# Patient Record
Sex: FEMALE | Race: WHITE | HISPANIC OR LATINO | Employment: FULL TIME | URBAN - METROPOLITAN AREA
[De-identification: names, ages, dates, MRNs, and addresses within clinical notes are randomized per-mention and may not be internally consistent; named-entity substitution may affect disease eponyms.]

---

## 2022-01-28 ENCOUNTER — OFFICE VISIT (OUTPATIENT)
Dept: OPHTHALMOLOGY | Facility: CLINIC | Age: 27
End: 2022-01-28

## 2022-01-28 DIAGNOSIS — H40.52X3 SEVERE STAGE SECONDARY GLAUCOMA OF LEFT EYE DUE TO COMBINATION MECHANISMS: Primary | ICD-10-CM

## 2022-01-28 DIAGNOSIS — H50.112 EXOTROPIA OF LEFT EYE: ICD-10-CM

## 2022-01-28 DIAGNOSIS — H26.9 CORTICAL CATARACT OF LEFT EYE: ICD-10-CM

## 2022-01-28 PROCEDURE — 99202 OFFICE O/P NEW SF 15 MIN: CPT | Mod: PBBFAC | Performed by: OPTOMETRIST

## 2022-01-28 PROCEDURE — 99999 PR PBB SHADOW E&M-NEW PATIENT-LVL II: ICD-10-PCS | Mod: PBBFAC,,, | Performed by: OPTOMETRIST

## 2022-01-28 PROCEDURE — 92004 COMPRE OPH EXAM NEW PT 1/>: CPT | Mod: S$PBB,,, | Performed by: OPTOMETRIST

## 2022-01-28 PROCEDURE — 92133 CPTRZD OPH DX IMG PST SGM ON: CPT | Mod: PBBFAC | Performed by: OPTOMETRIST

## 2022-01-28 PROCEDURE — 92133 POSTERIOR SEGMENT OCT OPTIC NERVE(OCULAR COHERENCE TOMOGRAPHY) - OU - BOTH EYES: ICD-10-PCS | Mod: 26,S$PBB,, | Performed by: OPTOMETRIST

## 2022-01-28 PROCEDURE — 99999 PR PBB SHADOW E&M-NEW PATIENT-LVL II: CPT | Mod: PBBFAC,,, | Performed by: OPTOMETRIST

## 2022-01-28 PROCEDURE — 92004 PR EYE EXAM, NEW PATIENT,COMPREHESV: ICD-10-PCS | Mod: S$PBB,,, | Performed by: OPTOMETRIST

## 2022-01-28 RX ORDER — DORZOLAMIDE HYDROCHLORIDE AND TIMOLOL MALEATE 20; 5 MG/ML; MG/ML
1 SOLUTION/ DROPS OPHTHALMIC 2 TIMES DAILY
Qty: 10 ML | Refills: 6 | Status: SHIPPED | OUTPATIENT
Start: 2022-01-28 | End: 2023-10-10 | Stop reason: SDUPTHER

## 2022-01-28 RX ORDER — BRIMONIDINE TARTRATE 1.5 MG/ML
1 SOLUTION/ DROPS OPHTHALMIC 3 TIMES DAILY
Qty: 15 ML | Refills: 4 | Status: SHIPPED | OUTPATIENT
Start: 2022-01-28 | End: 2022-02-02

## 2022-01-28 RX ORDER — PREDNISOLONE ACETATE 10 MG/ML
1 SUSPENSION/ DROPS OPHTHALMIC 4 TIMES DAILY
Qty: 5 ML | Refills: 0 | Status: SHIPPED | OUTPATIENT
Start: 2022-01-28 | End: 2022-03-02 | Stop reason: ALTCHOICE

## 2022-02-02 ENCOUNTER — OFFICE VISIT (OUTPATIENT)
Dept: OPHTHALMOLOGY | Facility: CLINIC | Age: 27
End: 2022-02-02

## 2022-02-02 DIAGNOSIS — H26.9 CORTICAL CATARACT OF LEFT EYE: ICD-10-CM

## 2022-02-02 DIAGNOSIS — H50.112 EXOTROPIA OF LEFT EYE: ICD-10-CM

## 2022-02-02 DIAGNOSIS — H20.9 IRITIS OF LEFT EYE: ICD-10-CM

## 2022-02-02 DIAGNOSIS — H40.52X3 SEVERE STAGE SECONDARY GLAUCOMA OF LEFT EYE DUE TO COMBINATION MECHANISMS: Primary | ICD-10-CM

## 2022-02-02 PROCEDURE — 99212 OFFICE O/P EST SF 10 MIN: CPT | Mod: PBBFAC | Performed by: OPHTHALMOLOGY

## 2022-02-02 PROCEDURE — 99202 PR OFFICE/OUTPT VISIT, NEW, LEVL II, 15-29 MIN: ICD-10-PCS | Mod: S$PBB,,, | Performed by: OPHTHALMOLOGY

## 2022-02-02 PROCEDURE — 99202 OFFICE O/P NEW SF 15 MIN: CPT | Mod: S$PBB,,, | Performed by: OPHTHALMOLOGY

## 2022-02-02 PROCEDURE — 99999 PR PBB SHADOW E&M-EST. PATIENT-LVL II: CPT | Mod: PBBFAC,,, | Performed by: OPHTHALMOLOGY

## 2022-02-02 PROCEDURE — 99999 PR PBB SHADOW E&M-EST. PATIENT-LVL II: ICD-10-PCS | Mod: PBBFAC,,, | Performed by: OPHTHALMOLOGY

## 2022-02-02 RX ORDER — BRIMONIDINE TARTRATE 2 MG/ML
1 SOLUTION/ DROPS OPHTHALMIC 3 TIMES DAILY
Qty: 5 ML | Refills: 6 | Status: SHIPPED | OUTPATIENT
Start: 2022-02-02 | End: 2023-10-10 | Stop reason: SDUPTHER

## 2022-02-02 NOTE — PROGRESS NOTES
HPI     Pt in today for a follow-up with from [facility]. Pt states her vision has been   stable since her last visit. Pt denies any ocular pain or discomfort. Pt   states she tried to get all the drops from the pharmacy, but she couldn't   Brimonidine.      [facility] referral      1. Uveitic COAG OS  2. Cortical Cataract OS  3. Exotropia OS      Pred QID OS  Dorz/Donny BID OS  Brimonidine BID OS      Last edited by Deisi Strauss on 2/2/2022  4:03 PM. (History)            Assessment /Plan     For exam results, see Encounter Report.      ICD-10-CM ICD-9-CM    1. 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    Discussed possibly performing SLT OS to lower IOP, however, patient reports with no pain despite elevated pressure. Will likely proceed without further treatment.     Recommend patient continue with current drop regimine, will monitor IOP at next visit.    2. Cortical cataract of left eye  H26.9 366.8 Removal not likely to improve vision, will monitor at this time.    3. Exotropia of left eye  H50.10 378.10 Monitor at this time       Return to clinic 1 month         Pred QID OS  Dorz/Donny BID OS  Brimonidine BID OS

## 2022-02-02 NOTE — PROGRESS NOTES
HPI     Pt in today for a follow-up with from [facility]. Pt states her vision has been   stable since her last visit. Pt denies any ocular pain or discomfort. Pt   states she tried to get all the drops from the pharmacy, but she couldn't   Brimonidine.      [facility] referral      1. Uveitic COAG OS  2. Cortical Cataract OS  3. Exotropia OS      Pred QID OS  Dorz/Donny BID OS  Brimonidine BID OS      Last edited by Deisi Strauss on 2/2/2022  4:03 PM. (History)            Assessment /Plan     For exam results, see Encounter Report.      ICD-10-CM ICD-9-CM    1. 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    Discussed performing SLT OS to lower IOP, however, patient reports with no pain despite elevated pressure. Will likely proceed without further treatment.     Recommend patient continue with current drop regimine, will monitor IOP at next visit.    2. Cortical cataract of left eye  H26.9 366.8 Removal not likely to improve vision, will monitor at this time.    3. Exotropia of left eye  H50.10 378.10 Monitor    4. Iritis of left eye  H20.9 364.3 Slight worsening on exam today, will continue Pred QID OS. Could consider d/c medication at next visit.       Return to clinic 1 month for IOP check        Pred QID OS  Dorz/Donny BID OS  Brimonidine BID OS

## 2022-02-02 NOTE — Clinical Note
Bryan,  I saw Miss Hay today, unfortunately her IOP remains elevated in the left eye. I discussed with her possibly performing an SLT, but due to limited visual potential we may proceed without additional treatment.   She had trouble picking up the Brimonidine due to cost-- it looks like the 1.5% was called in by mistake, so we are going to try the 2.0%.   Thank you! Dev

## 2022-03-02 ENCOUNTER — OFFICE VISIT (OUTPATIENT)
Dept: OPHTHALMOLOGY | Facility: CLINIC | Age: 27
End: 2022-03-02

## 2022-03-02 DIAGNOSIS — H26.9 CORTICAL CATARACT OF LEFT EYE: ICD-10-CM

## 2022-03-02 DIAGNOSIS — H20.9 IRITIS OF LEFT EYE: ICD-10-CM

## 2022-03-02 DIAGNOSIS — H40.52X3 SEVERE STAGE SECONDARY GLAUCOMA OF LEFT EYE DUE TO COMBINATION MECHANISMS: Primary | ICD-10-CM

## 2022-03-02 DIAGNOSIS — H50.112 EXOTROPIA OF LEFT EYE: ICD-10-CM

## 2022-03-02 PROCEDURE — 99212 PR OFFICE/OUTPT VISIT, EST, LEVL II, 10-19 MIN: ICD-10-PCS | Mod: S$PBB,,, | Performed by: OPHTHALMOLOGY

## 2022-03-02 PROCEDURE — 99999 PR PBB SHADOW E&M-EST. PATIENT-LVL II: ICD-10-PCS | Mod: PBBFAC,,, | Performed by: OPHTHALMOLOGY

## 2022-03-02 PROCEDURE — 99212 OFFICE O/P EST SF 10 MIN: CPT | Mod: PBBFAC | Performed by: OPHTHALMOLOGY

## 2022-03-02 PROCEDURE — 99212 OFFICE O/P EST SF 10 MIN: CPT | Mod: S$PBB,,, | Performed by: OPHTHALMOLOGY

## 2022-03-02 PROCEDURE — 99999 PR PBB SHADOW E&M-EST. PATIENT-LVL II: CPT | Mod: PBBFAC,,, | Performed by: OPHTHALMOLOGY

## 2022-03-02 NOTE — PROGRESS NOTES
HPI     Glaucoma     Comments: 1 month for IOP check              Comments     Patient states no visual complaints but this past Monday pain with the   left eye only. Pain Scale 5    [facility] referral      1. Uveitic COAG OS  2. Cortical Cataract OS  3. Exotropia OS      Pred QID OS  Dorz/Donny BID OS  Brimonidine BID OS          Last edited by Stephany Burgess on 3/2/2022  4:06 PM. (History)            Assessment /Plan     For exam results, see Encounter Report.      ICD-10-CM ICD-9-CM    1. 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 Iritis of left eye  H20.9 364.3 improved today yet increased IOP still and pt is NLP   Pupil is fixed and dilated    3. Cortical cataract of left eye  H26.9 366.8 Follow    4. Exotropia of left eye  H50.10 378.10 Follow      Stop Pred today unless sxs increase then she can increase     Dorz/Donny BID OS  Brimonidine BID OS     RETURN TO CLINIC 6 month IOP

## 2022-11-14 NOTE — PROGRESS NOTES
HPI     Annual Exam     Comments: New Patient               Comments     Last Eye Exam 2021  Patient states decrease with overall vision.  No ocular pain/discomfort.                Last edited by Stephany Burgess on 1/28/2022  3:40 PM. (History)        No vision OS x 1 year per pt  No history of trauma OS    Assessment /Plan     For exam results, see Encounter Report.    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  Dispense: 5 mL; Refill: 0  -     dorzolamide-timolol 2-0.5% (COSOPT) 22.3-6.8 mg/mL ophthalmic solution; Place 1 drop into the left eye 2 (two) times daily.  Dispense: 10 mL; Refill: 6  -     brimonidine 0.15 % OPTH DROP (ALPHAGAN) 0.15 % ophthalmic solution; Place 1 drop into the left eye 3 (three) times daily.  Dispense: 15 mL; Refill: 4      Uveitic glaucoma OS  No pain  Discussed monocular precautions with patient  Start gtts as directed  Refer to Dr Baptiste for eval/consult, next available                      - dvt ppx, requires full dose lovenox

## 2023-10-10 ENCOUNTER — OFFICE VISIT (OUTPATIENT)
Dept: OPHTHALMOLOGY | Facility: CLINIC | Age: 28
End: 2023-10-10

## 2023-10-10 DIAGNOSIS — H50.112 EXOTROPIA OF LEFT EYE: ICD-10-CM

## 2023-10-10 DIAGNOSIS — H26.9 CORTICAL CATARACT OF LEFT EYE: ICD-10-CM

## 2023-10-10 DIAGNOSIS — H40.52X3 NEOVASCULAR GLAUCOMA OF LEFT EYE, SEVERE STAGE: Primary | ICD-10-CM

## 2023-10-10 DIAGNOSIS — H40.52X3 SEVERE STAGE SECONDARY GLAUCOMA OF LEFT EYE DUE TO COMBINATION MECHANISMS: ICD-10-CM

## 2023-10-10 DIAGNOSIS — H20.9 IRITIS OF LEFT EYE: ICD-10-CM

## 2023-10-10 PROCEDURE — 99213 PR OFFICE/OUTPT VISIT, EST, LEVL III, 20-29 MIN: ICD-10-PCS | Mod: S$PBB,,, | Performed by: OPHTHALMOLOGY

## 2023-10-10 PROCEDURE — 99999 PR PBB SHADOW E&M-EST. PATIENT-LVL II: CPT | Mod: PBBFAC,,, | Performed by: OPHTHALMOLOGY

## 2023-10-10 PROCEDURE — 92133 CPTRZD OPH DX IMG PST SGM ON: CPT | Mod: PBBFAC | Performed by: OPHTHALMOLOGY

## 2023-10-10 PROCEDURE — 92133 POSTERIOR SEGMENT OCT OPTIC NERVE(OCULAR COHERENCE TOMOGRAPHY) - OU - BOTH EYES: ICD-10-PCS | Mod: 26,S$PBB,, | Performed by: OPHTHALMOLOGY

## 2023-10-10 PROCEDURE — 99212 OFFICE O/P EST SF 10 MIN: CPT | Mod: PBBFAC | Performed by: OPHTHALMOLOGY

## 2023-10-10 PROCEDURE — 99999 PR PBB SHADOW E&M-EST. PATIENT-LVL II: ICD-10-PCS | Mod: PBBFAC,,, | Performed by: OPHTHALMOLOGY

## 2023-10-10 PROCEDURE — 99213 OFFICE O/P EST LOW 20 MIN: CPT | Mod: S$PBB,,, | Performed by: OPHTHALMOLOGY

## 2023-10-10 RX ORDER — DORZOLAMIDE HYDROCHLORIDE AND TIMOLOL MALEATE 20; 5 MG/ML; MG/ML
1 SOLUTION/ DROPS OPHTHALMIC 2 TIMES DAILY
Qty: 10 ML | Refills: 6 | Status: SHIPPED | OUTPATIENT
Start: 2023-10-10 | End: 2023-10-13 | Stop reason: SDUPTHER

## 2023-10-10 RX ORDER — BRIMONIDINE TARTRATE 2 MG/ML
1 SOLUTION/ DROPS OPHTHALMIC 2 TIMES DAILY
Qty: 5 ML | Refills: 6 | Status: SHIPPED | OUTPATIENT
Start: 2023-10-10 | End: 2024-10-09

## 2023-10-10 NOTE — PROGRESS NOTES
HPI     Glaucoma            Comments: Coag lost to f/u. Denies pain or irritation. Va stable. Has not   used drops in 8 months- she missed an appointment and pharmacy did not   want to fill Rx.           Comments      1. Uveitic COAG OS  2. Cortical Cataract OS  3. Exotropia OS            Last edited by Des Mcknight on 10/10/2023  3:10 PM.            Assessment /Plan     For exam results, see Encounter Report.      ICD-10-CM ICD-9-CM    1. Neovascular glaucoma of left eye, severe stage  H40.52X3 365.63 Posterior Segment OCT Optic Nerve- Both eyes    Neovascularization noted today, new finding. IOP very elevated today. Will resume drops, and refer to [facility] for possible AVGF     365.73       2. Iritis of left eye  H20.9 364.3 None on exam today      3. Cortical cataract of left eye  H26.9 366.8       4. Exotropia of left eye  H50.112 378.10           Return to clinic 3 weeks with [facility] for eval   Will return with me after to recheck IOP

## 2023-10-13 ENCOUNTER — TELEPHONE (OUTPATIENT)
Dept: OPHTHALMOLOGY | Facility: CLINIC | Age: 28
End: 2023-10-13

## 2023-10-13 DIAGNOSIS — H40.52X3 SEVERE STAGE SECONDARY GLAUCOMA OF LEFT EYE DUE TO COMBINATION MECHANISMS: ICD-10-CM

## 2023-10-13 RX ORDER — DORZOLAMIDE HYDROCHLORIDE AND TIMOLOL MALEATE 20; 5 MG/ML; MG/ML
1 SOLUTION/ DROPS OPHTHALMIC 2 TIMES DAILY
Qty: 10 ML | Refills: 6 | Status: SHIPPED | OUTPATIENT
Start: 2023-10-13

## 2023-10-13 NOTE — TELEPHONE ENCOUNTER
----- Message from Magda Pettit sent at 10/13/2023  3:26 PM CDT -----  Contact: Ivelisse Oviedo called in to say that her date of birth 1995 is incorrect and the correct YOB: 1995 she stated that the pharmacy gave her a hard time with getting her medication, please call her at [phone].    Thanks  Td

## 2023-10-13 NOTE — TELEPHONE ENCOUNTER
Called number listed in message and that is the wrong phone number   I then called the pharm and told them what the pt was stating re:  difference- Pharm said they had the brimonidine ready but never received the cosopt script- Dr. Baptiste sent another Rx over- they will contact the pt that the Rx's are ready and I told them I cant change her  without documentation - they understand and they are aware that she may state a different  along with  the language barrier

## 2024-01-26 ENCOUNTER — PROCEDURE VISIT (OUTPATIENT)
Dept: OPHTHALMOLOGY | Facility: CLINIC | Age: 29
End: 2024-01-26

## 2024-01-26 DIAGNOSIS — H40.52X3 SEVERE STAGE SECONDARY GLAUCOMA OF LEFT EYE DUE TO COMBINATION MECHANISMS: ICD-10-CM

## 2024-01-26 DIAGNOSIS — H50.112 EXOTROPIA OF LEFT EYE: ICD-10-CM

## 2024-01-26 DIAGNOSIS — H40.52X3 NEOVASCULAR GLAUCOMA OF LEFT EYE, SEVERE STAGE: Primary | ICD-10-CM

## 2024-01-26 DIAGNOSIS — H26.9 CORTICAL CATARACT OF LEFT EYE: ICD-10-CM

## 2024-01-26 PROCEDURE — 92014 COMPRE OPH EXAM EST PT 1/>: CPT | Mod: S$PBB,,, | Performed by: OPHTHALMOLOGY

## 2024-01-26 NOTE — PROGRESS NOTES
===============================  Date today is 1/26/2024  Ivelisse Gutierrez is a 28 y.o. female  Last visit [facility]: :Visit date not found   Last visit eye dept. 10/10/2023    Uncorrected distance visual acuity was 20/20 in the right eye and NLP in the left eye.  Tonometry       Tonometry (Applanation, 1:54 PM)         Right Left    Pressure 20 48                  Not recorded       Not recorded       Not recorded       Chief Complaint   Patient presents with    Neovascular glaucoma of left eye, severe stage     Eval per Dr. MONTELONGO/  JESS     HPI     Neovascular glaucoma of left eye, severe stage     Additional comments: Eval per Dr. MONTELONGO/  JESS           Comments      1. Uveitic COAG OS  2. Cortical Cataract OS  3. Exotropia OS            Last edited by Marni Mcnamara on 1/26/2024  1:31 PM.      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  Check OU independently daily.    Gave my office and personal cell phone number.  ________________  1/26/2024 today  Ivelisse Gutierrez  :  Uninsured   Presented 2/22   Os nlp  and 54- NLP x3 years  Pressure tx per dr murdock  Uveitic glaucoma per dr murdock  Sensory XT   OD white conj  AC 2+ flare  Cornea clear  Clear lens  Vitreous clear view in  OS subtle but diffuse atrophic thready NVI  Clear lens OS  Clear vitreous OS  No diabetes, no HTN, general good health  OD c/d 0.4 good perfusion  Vessels normal caliber, no X's, no embolus  OS c/d 0.99 pale  No embolic dz  Narrowed arterioles OS  ?why NVG OS  No systemic inflammatory symptoms  No underlying vasculopathy  No coronary artery dz  Would consider w/u for underlying inflammatory and vascular dz  Also consider neuro  imaging, visual system and orbits  Full D&V  No ptosis, no proptosis  Discussed OD completely normal, OS no vision, will not get better  Don't expect right eye to have any problems  Advised to call or text if left eye start to hurt  IOP today 20/48  Continue drops for now-  painful eye if pt don't use drops, no redness  Do Avgf only for pain  Looks inactive  Continue f/u with Dr. Baptiste     RTC 2 months with Dr. Baptiste for IOP check  Instructed to call 24/7 for any worsening of vision or symptoms. Check OU daily.   Gave my office and cell phone number.    =============================

## 2024-03-26 ENCOUNTER — OFFICE VISIT (OUTPATIENT)
Dept: OPHTHALMOLOGY | Facility: CLINIC | Age: 29
End: 2024-03-26

## 2024-03-26 DIAGNOSIS — H40.52X3 SEVERE STAGE SECONDARY GLAUCOMA OF LEFT EYE DUE TO COMBINATION MECHANISMS: ICD-10-CM

## 2024-03-26 DIAGNOSIS — H50.112 EXOTROPIA OF LEFT EYE: ICD-10-CM

## 2024-03-26 DIAGNOSIS — H40.52X3 NEOVASCULAR GLAUCOMA OF LEFT EYE, SEVERE STAGE: Primary | ICD-10-CM

## 2024-03-26 DIAGNOSIS — H20.9 IRITIS OF LEFT EYE: ICD-10-CM

## 2024-03-26 DIAGNOSIS — H40.001 GLAUCOMA SUSPECT OF RIGHT EYE: ICD-10-CM

## 2024-03-26 PROCEDURE — 99999 PR PBB SHADOW E&M-EST. PATIENT-LVL II: CPT | Mod: PBBFAC,,, | Performed by: OPHTHALMOLOGY

## 2024-03-26 PROCEDURE — 99212 OFFICE O/P EST SF 10 MIN: CPT | Mod: PBBFAC | Performed by: OPHTHALMOLOGY

## 2024-03-26 PROCEDURE — 99214 OFFICE O/P EST MOD 30 MIN: CPT | Mod: S$PBB,,, | Performed by: OPHTHALMOLOGY

## 2024-03-26 NOTE — PROGRESS NOTES
HPI     Glaucoma            Comments: Here for IOP check.  Had visit with Dr. Devries 01/26/2024          Comments    1. Neovascular COAG OS  Loss of vision since 2020 (uncertain etiology)   2. Cortical Cataract OS  3. Exotropia OS             Last edited by Maylin Parker MA on 3/26/2024 10:31 AM.            Assessment /Plan     For exam results, see Encounter Report.      ICD-10-CM ICD-9-CM    1. Neovascular glaucoma of left eye, severe stage  H40.52X3 365.63 IOP remains above target, though pt reports of no pain  No avgf per [facility] unless OS becomes painful  Continue drops as below     365.73       2. Glaucoma suspect of right eye  H40.001 365.00 IOP OD above target today  Will start Cosopt OD      3. 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 Exotropia of left eye  H50.112 378.10       5. Iritis of left eye  H20.9 364.3           Return to clinic 2 months for IOP      Brimonidine BID OS  Cosopt BID OU

## 2024-06-04 ENCOUNTER — OFFICE VISIT (OUTPATIENT)
Dept: OPHTHALMOLOGY | Facility: CLINIC | Age: 29
End: 2024-06-04

## 2024-06-04 DIAGNOSIS — H20.9 IRITIS OF LEFT EYE: ICD-10-CM

## 2024-06-04 DIAGNOSIS — H40.52X3 SEVERE STAGE SECONDARY GLAUCOMA OF LEFT EYE DUE TO COMBINATION MECHANISMS: ICD-10-CM

## 2024-06-04 DIAGNOSIS — H40.001 GLAUCOMA SUSPECT OF RIGHT EYE: ICD-10-CM

## 2024-06-04 DIAGNOSIS — H40.52X3 NEOVASCULAR GLAUCOMA OF LEFT EYE, SEVERE STAGE: Primary | ICD-10-CM

## 2024-06-04 PROCEDURE — 99214 OFFICE O/P EST MOD 30 MIN: CPT | Mod: S$PBB,,, | Performed by: OPHTHALMOLOGY

## 2024-06-04 PROCEDURE — 99999 PR PBB SHADOW E&M-EST. PATIENT-LVL II: CPT | Mod: PBBFAC,,, | Performed by: OPHTHALMOLOGY

## 2024-06-04 PROCEDURE — 99212 OFFICE O/P EST SF 10 MIN: CPT | Mod: PBBFAC | Performed by: OPHTHALMOLOGY

## 2024-06-04 RX ORDER — ATROPINE SULFATE 10 MG/ML
1 SOLUTION/ DROPS OPHTHALMIC DAILY
Qty: 5 ML | Refills: 3 | Status: SHIPPED | OUTPATIENT
Start: 2024-06-04

## 2024-06-04 NOTE — PATIENT INSTRUCTIONS
Left: Brimonidine (purple)- 2xs daily   Left: Atropine (red)- 1x daily left eye    Both: Cosopt (blue)- 2xs daily

## 2024-06-04 NOTE — PROGRESS NOTES
HPI     Glaucoma            Comments: 2 MO IOP Check: Pt states using drops as directed. States the   left eye does get red some times.          Comments    Requires the Damian [employer] Speaker    [facility] referral      1. Neovascular COAG OS  Loss of vision since 2020 (uncertain etiology)   2. Cortical Cataract OS  3. Exotropia OS      Brimonidine BID OS  Cosopt BID OD             Last edited by Jez Baptiste MD on 6/4/2024  3:07 PM.            Assessment /Plan     For exam results, see Encounter Report.      ICD-10-CM ICD-9-CM    1. Neovascular glaucoma of left eye, severe stage  H40.52X3 365.63 IOP OS remains elevated  Add Cosopt OS  Pt r/o occasional pain, add Atropine OS     365.73       2. 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 Glaucoma suspect of right eye  H40.001 365.00 IOP OD improved today on Cosopt       4. Iritis of left eye  H20.9 364.3           Return to clinic 3-4 months for IOP  Recommend +1.25 OTC readers        Brimonidine BID OS  Cosopt BID OU  Atropine qd OS

## 2024-07-27 DIAGNOSIS — H40.52X3 SEVERE STAGE SECONDARY GLAUCOMA OF LEFT EYE DUE TO COMBINATION MECHANISMS: ICD-10-CM

## 2024-07-29 RX ORDER — BRIMONIDINE TARTRATE 2 MG/ML
1 SOLUTION/ DROPS OPHTHALMIC 2 TIMES DAILY
Qty: 5 ML | Refills: 6 | Status: SHIPPED | OUTPATIENT
Start: 2024-07-29 | End: 2024-07-30 | Stop reason: SDUPTHER

## 2024-07-30 DIAGNOSIS — H40.52X3 SEVERE STAGE SECONDARY GLAUCOMA OF LEFT EYE DUE TO COMBINATION MECHANISMS: ICD-10-CM

## 2024-07-30 RX ORDER — BRIMONIDINE TARTRATE 2 MG/ML
1 SOLUTION/ DROPS OPHTHALMIC 2 TIMES DAILY
Qty: 5 ML | Refills: 6 | Status: SHIPPED | OUTPATIENT
Start: 2024-07-30

## 2024-10-01 ENCOUNTER — OFFICE VISIT (OUTPATIENT)
Dept: OPHTHALMOLOGY | Facility: CLINIC | Age: 29
End: 2024-10-01

## 2024-10-01 DIAGNOSIS — H40.52X3 NEOVASCULAR GLAUCOMA OF LEFT EYE, SEVERE STAGE: ICD-10-CM

## 2024-10-01 DIAGNOSIS — H40.001 GLAUCOMA SUSPECT OF RIGHT EYE: ICD-10-CM

## 2024-10-01 DIAGNOSIS — H20.9 IRITIS OF LEFT EYE: ICD-10-CM

## 2024-10-01 DIAGNOSIS — H40.52X3 SEVERE STAGE SECONDARY GLAUCOMA OF LEFT EYE DUE TO COMBINATION MECHANISMS: Primary | ICD-10-CM

## 2024-10-01 PROCEDURE — 99212 OFFICE O/P EST SF 10 MIN: CPT | Mod: PBBFAC | Performed by: OPHTHALMOLOGY

## 2024-10-01 PROCEDURE — 99999 PR PBB SHADOW E&M-EST. PATIENT-LVL II: CPT | Mod: PBBFAC,,, | Performed by: OPHTHALMOLOGY

## 2024-10-01 PROCEDURE — 92012 INTRM OPH EXAM EST PATIENT: CPT | Mod: S$PBB,,, | Performed by: OPHTHALMOLOGY

## 2024-10-01 RX ORDER — FLUOROMETHOLONE 1 MG/ML
1 SUSPENSION/ DROPS OPHTHALMIC 2 TIMES DAILY
Qty: 5 ML | Refills: 1 | Status: SHIPPED | OUTPATIENT
Start: 2024-10-01

## 2024-10-01 NOTE — PROGRESS NOTES
HPI     Glaucoma            Comments: Pt here for iOP check   Pt states atropine gave her pain OS and only used twice  Pt using brimonidine BID OS and cosopt BID OU            Last edited by Zhou Colvin on 10/1/2024  1:24 PM.            Assessment /Plan     For exam results, see Encounter Report.      ICD-10-CM ICD-9-CM    1. 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 Neovascular glaucoma of left eye, severe stage  H40.52X3 365.63      365.73       3. Glaucoma suspect of right eye  H40.001 365.00 IOP stable OD, follow       4. Iritis of left eye  H20.9 364.3 H/o- follow           Can try OTC Naphcon A to help with redness or  will try FML BID OS to help with redness   RETURN TO CLINIC 4 months DOA   Start FML BID OS   brimonidine BID OS and cosopt BID OU

## 2024-10-02 DIAGNOSIS — H40.52X3 SEVERE STAGE SECONDARY GLAUCOMA OF LEFT EYE DUE TO COMBINATION MECHANISMS: ICD-10-CM

## 2024-10-03 RX ORDER — DORZOLAMIDE HYDROCHLORIDE AND TIMOLOL MALEATE 20; 5 MG/ML; MG/ML
1 SOLUTION/ DROPS OPHTHALMIC 2 TIMES DAILY
Qty: 10 ML | Refills: 6 | Status: SHIPPED | OUTPATIENT
Start: 2024-10-03

## 2025-07-31 DIAGNOSIS — H40.52X3 SEVERE STAGE SECONDARY GLAUCOMA OF LEFT EYE DUE TO COMBINATION MECHANISMS: ICD-10-CM

## 2025-07-31 RX ORDER — BRIMONIDINE TARTRATE 2 MG/ML
1 SOLUTION/ DROPS OPHTHALMIC 2 TIMES DAILY
Qty: 5 ML | Refills: 1 | Status: SHIPPED | OUTPATIENT
Start: 2025-07-31